# Patient Record
(demographics unavailable — no encounter records)

---

## 2025-07-22 NOTE — IMAGING
[Left] : left tibia/fibula [de-identified] : 2 views (AP, Lateral) obtained and interpreted on 07/22/2025. There are no fractures, subluxations or dislocations. No significant abnormalities are seen.

## 2025-07-22 NOTE — DISCUSSION/SUMMARY
[de-identified] : 62m with left medial gastroc strain.  The patient was advised of the diagnosis. The natural history of the pathology was explained in full to the patient in layman's terms. All questions were answered.  1) Unable to take nsaids d/t hx of kidney cancer 2) patient is instructed to start physical therapy 3) heat, cryotherapy, rest and activity modification    4) rtc prn   Entered by Ambika Salazar acting as scribe. Dr. Randolph- The documentation recorded by the scribe accurately reflects the service I personally performed and the decisions made by me.

## 2025-07-22 NOTE — HISTORY OF PRESENT ILLNESS
[de-identified] : 07/22/2025 Mr. DIOGENES DEAN, a RHD 62 year old male (currently retired), presents today for left calf pain that began Wednesday, 07/16/25, while playing pickleball, pt states he felt a pop while stepping backwards. Pt states he was unable to ambulate after the "pop". Pt states pain has decreased since initial injury. C/o swelling after initial injury. Pt describes pain as sharp with movement located over the medial aspect of the calf.   PMH: Stage 4 Kidney cancer (Diagnosed 2019)

## 2025-07-22 NOTE — PHYSICAL EXAM
[Left] : left foot and ankle [] : patient ambulates without assistive device [[email or web address]] : ttp medial gastroc